# Patient Record
Sex: MALE | Race: WHITE | NOT HISPANIC OR LATINO | URBAN - METROPOLITAN AREA
[De-identification: names, ages, dates, MRNs, and addresses within clinical notes are randomized per-mention and may not be internally consistent; named-entity substitution may affect disease eponyms.]

---

## 2019-02-13 ENCOUNTER — EMERGENCY (EMERGENCY)
Facility: HOSPITAL | Age: 29
LOS: 1 days | End: 2019-02-13

## 2019-02-13 VITALS
TEMPERATURE: 98 F | HEIGHT: 72 IN | WEIGHT: 244.93 LBS | DIASTOLIC BLOOD PRESSURE: 78 MMHG | OXYGEN SATURATION: 98 % | SYSTOLIC BLOOD PRESSURE: 135 MMHG | HEART RATE: 94 BPM

## 2020-03-04 ENCOUNTER — APPOINTMENT (OUTPATIENT)
Dept: INTERNAL MEDICINE | Facility: CLINIC | Age: 30
End: 2020-03-04

## 2020-04-02 ENCOUNTER — APPOINTMENT (OUTPATIENT)
Dept: INTERNAL MEDICINE | Facility: CLINIC | Age: 30
End: 2020-04-02

## 2020-06-26 ENCOUNTER — NON-APPOINTMENT (OUTPATIENT)
Age: 30
End: 2020-06-26

## 2020-06-26 ENCOUNTER — TRANSCRIPTION ENCOUNTER (OUTPATIENT)
Age: 30
End: 2020-06-26

## 2020-06-26 ENCOUNTER — APPOINTMENT (OUTPATIENT)
Dept: INTERNAL MEDICINE | Facility: CLINIC | Age: 30
End: 2020-06-26
Payer: MEDICAID

## 2020-06-26 VITALS
DIASTOLIC BLOOD PRESSURE: 84 MMHG | RESPIRATION RATE: 17 BRPM | WEIGHT: 264 LBS | TEMPERATURE: 97.7 F | HEIGHT: 72 IN | BODY MASS INDEX: 35.76 KG/M2 | SYSTOLIC BLOOD PRESSURE: 120 MMHG | HEART RATE: 84 BPM | OXYGEN SATURATION: 98 %

## 2020-06-26 DIAGNOSIS — J30.2 OTHER SEASONAL ALLERGIC RHINITIS: ICD-10-CM

## 2020-06-26 DIAGNOSIS — Z23 ENCOUNTER FOR IMMUNIZATION: ICD-10-CM

## 2020-06-26 DIAGNOSIS — R31.29 OTHER MICROSCOPIC HEMATURIA: ICD-10-CM

## 2020-06-26 DIAGNOSIS — Z87.898 PERSONAL HISTORY OF OTHER SPECIFIED CONDITIONS: ICD-10-CM

## 2020-06-26 DIAGNOSIS — Z87.440 PERSONAL HISTORY OF URINARY (TRACT) INFECTIONS: ICD-10-CM

## 2020-06-26 DIAGNOSIS — Z00.00 ENCOUNTER FOR GENERAL ADULT MEDICAL EXAMINATION W/OUT ABNORMAL FINDINGS: ICD-10-CM

## 2020-06-26 DIAGNOSIS — Z12.83 ENCOUNTER FOR SCREENING FOR MALIGNANT NEOPLASM OF SKIN: ICD-10-CM

## 2020-06-26 DIAGNOSIS — E78.1 PURE HYPERGLYCERIDEMIA: ICD-10-CM

## 2020-06-26 DIAGNOSIS — E55.9 VITAMIN D DEFICIENCY, UNSPECIFIED: ICD-10-CM

## 2020-06-26 DIAGNOSIS — Z83.438 FAMILY HISTORY OF OTHER DISORDER OF LIPOPROTEIN METABOLISM AND OTHER LIPIDEMIA: ICD-10-CM

## 2020-06-26 DIAGNOSIS — K57.90 DIVERTICULOSIS OF INTESTINE, PART UNSPECIFIED, W/OUT PERFORATION OR ABSCESS W/OUT BLEEDING: ICD-10-CM

## 2020-06-26 DIAGNOSIS — R79.89 OTHER SPECIFIED ABNORMAL FINDINGS OF BLOOD CHEMISTRY: ICD-10-CM

## 2020-06-26 PROCEDURE — G0447 BEHAVIOR COUNSEL OBESITY 15M: CPT

## 2020-06-26 PROCEDURE — G0444 DEPRESSION SCREEN ANNUAL: CPT

## 2020-06-26 PROCEDURE — 93000 ELECTROCARDIOGRAM COMPLETE: CPT | Mod: 59

## 2020-06-26 PROCEDURE — 99385 PREV VISIT NEW AGE 18-39: CPT | Mod: 25

## 2020-06-26 RX ORDER — CHOLECALCIFEROL (VITAMIN D3) 50 MCG
50 MCG TABLET ORAL
Refills: 0 | Status: ACTIVE | COMMUNITY
Start: 2020-06-26

## 2020-06-26 RX ORDER — CETIRIZINE HYDROCHLORIDE 10 MG/1
10 TABLET, FILM COATED ORAL
Refills: 0 | Status: ACTIVE | COMMUNITY
Start: 2020-06-26

## 2020-06-27 LAB
25(OH)D3 SERPL-MCNC: 37.3 NG/ML
ALBUMIN SERPL ELPH-MCNC: 5.1 G/DL
ALP BLD-CCNC: 94 U/L
ALT SERPL-CCNC: 43 U/L
ANION GAP SERPL CALC-SCNC: 14 MMOL/L
AST SERPL-CCNC: 24 U/L
BASOPHILS # BLD AUTO: 0.03 K/UL
BASOPHILS NFR BLD AUTO: 0.4 %
BILIRUB SERPL-MCNC: 0.5 MG/DL
BUN SERPL-MCNC: 12 MG/DL
CALCIUM SERPL-MCNC: 10 MG/DL
CHLORIDE SERPL-SCNC: 102 MMOL/L
CHOLEST SERPL-MCNC: 184 MG/DL
CHOLEST/HDLC SERPL: 4.1 RATIO
CO2 SERPL-SCNC: 23 MMOL/L
CREAT SERPL-MCNC: 0.99 MG/DL
EOSINOPHIL # BLD AUTO: 0.12 K/UL
EOSINOPHIL NFR BLD AUTO: 1.6 %
ESTIMATED AVERAGE GLUCOSE: 97 MG/DL
GLUCOSE SERPL-MCNC: 87 MG/DL
HBA1C MFR BLD HPLC: 5 %
HCT VFR BLD CALC: 49.8 %
HDLC SERPL-MCNC: 45 MG/DL
HGB BLD-MCNC: 15.7 G/DL
IMM GRANULOCYTES NFR BLD AUTO: 0.1 %
LDLC SERPL CALC-MCNC: 118 MG/DL
LYMPHOCYTES # BLD AUTO: 2.62 K/UL
LYMPHOCYTES NFR BLD AUTO: 35.1 %
MAN DIFF?: NORMAL
MCHC RBC-ENTMCNC: 29.6 PG
MCHC RBC-ENTMCNC: 31.5 GM/DL
MCV RBC AUTO: 94 FL
MONOCYTES # BLD AUTO: 0.52 K/UL
MONOCYTES NFR BLD AUTO: 7 %
NEUTROPHILS # BLD AUTO: 4.17 K/UL
NEUTROPHILS NFR BLD AUTO: 55.8 %
PLATELET # BLD AUTO: 245 K/UL
POTASSIUM SERPL-SCNC: 4.5 MMOL/L
PROT SERPL-MCNC: 7.6 G/DL
RBC # BLD: 5.3 M/UL
RBC # FLD: 12.2 %
SODIUM SERPL-SCNC: 139 MMOL/L
TRIGL SERPL-MCNC: 107 MG/DL
TSH SERPL-ACNC: 1.42 UIU/ML
WBC # FLD AUTO: 7.47 K/UL

## 2020-07-06 ENCOUNTER — APPOINTMENT (OUTPATIENT)
Dept: ULTRASOUND IMAGING | Facility: CLINIC | Age: 30
End: 2020-07-06

## 2020-12-14 ENCOUNTER — TRANSCRIPTION ENCOUNTER (OUTPATIENT)
Age: 30
End: 2020-12-14

## 2021-07-14 ENCOUNTER — APPOINTMENT (OUTPATIENT)
Dept: UROLOGY | Facility: CLINIC | Age: 31
End: 2021-07-14

## 2021-08-31 ENCOUNTER — APPOINTMENT (OUTPATIENT)
Dept: AFTER HOURS CARE | Facility: EMERGENCY ROOM | Age: 31
End: 2021-08-31
Payer: MEDICAID

## 2021-09-01 DIAGNOSIS — B08.4 ENTEROVIRAL VESICULAR STOMATITIS WITH EXANTHEM: ICD-10-CM

## 2021-09-01 PROCEDURE — 99203 OFFICE O/P NEW LOW 30 MIN: CPT | Mod: 95

## 2021-09-01 RX ORDER — LIDOCAINE HYDROCHLORIDE 40 MG/ML
4 SOLUTION TOPICAL
Qty: 2 | Refills: 0 | Status: ACTIVE | COMMUNITY
Start: 2021-09-01 | End: 1900-01-01

## 2021-09-01 NOTE — HISTORY OF PRESENT ILLNESS
[Home] : at home, [unfilled] , at the time of the visit. [Other Location: e.g. Home (Enter Location, City,State)___] : at [unfilled] [Verbal consent obtained from patient] : the patient, [unfilled] [FreeTextEntry8] : 30M p/w 3d 6-8 painful oral lesions (1-2 under tongue, 2 on each cheek, 1-2 in throat) in association with 2 lesions on L palm and one on L sole which went away already. No fever. No STD exposure, No ticks, no other rash, No blisters.

## 2024-05-21 ENCOUNTER — APPOINTMENT (OUTPATIENT)
Dept: GASTROENTEROLOGY | Facility: CLINIC | Age: 34
End: 2024-05-21
Payer: MEDICAID

## 2024-05-21 VITALS
TEMPERATURE: 97.9 F | BODY MASS INDEX: 34.4 KG/M2 | DIASTOLIC BLOOD PRESSURE: 85 MMHG | OXYGEN SATURATION: 99 % | SYSTOLIC BLOOD PRESSURE: 128 MMHG | HEART RATE: 74 BPM | HEIGHT: 72 IN | WEIGHT: 254 LBS

## 2024-05-21 DIAGNOSIS — K59.09 OTHER CONSTIPATION: ICD-10-CM

## 2024-05-21 PROCEDURE — 99204 OFFICE O/P NEW MOD 45 MIN: CPT

## 2024-05-21 RX ORDER — POLYETHYLENE GLYCOL 3350 AND ELECTROLYTES WITH LEMON FLAVOR 236; 22.74; 6.74; 5.86; 2.97 G/4L; G/4L; G/4L; G/4L; G/4L
236 POWDER, FOR SOLUTION ORAL
Qty: 1 | Refills: 0 | Status: ACTIVE | COMMUNITY
Start: 2024-05-21 | End: 1900-01-01

## 2024-05-21 RX ORDER — LUBIPROSTONE 24 UG/1
24 CAPSULE ORAL
Qty: 60 | Refills: 3 | Status: ACTIVE | COMMUNITY
Start: 2024-05-21 | End: 1900-01-01

## 2024-05-21 NOTE — PHYSICAL EXAM
[Alert] : alert [Healthy Appearing] : healthy appearing [Sclera] : the sclera and conjunctiva were normal [Hearing Threshold Finger Rub Not Stokes] : hearing was normal [Normal Appearance] : the appearance of the neck was normal [No Respiratory Distress] : no respiratory distress [Normal S1, S2] : normal S1 and S2 [Abdomen Tenderness] : non-tender [Abdomen Soft] : soft [No Rectal Mass] : no rectal mass [No CVA Tenderness] : no CVA  tenderness [No Clubbing, Cyanosis] : no clubbing or cyanosis of the fingernails [] : no rash [Oriented To Time, Place, And Person] : oriented to person, place, and time [de-identified] : internal hemorrhoid,no stool in vault [de-identified] : mildly distended

## 2024-05-21 NOTE — ASSESSMENT
[FreeTextEntry1] : constipation probably due to semiglutide medication  plan bowel prep  x 1  then start amitiza bid

## 2024-05-21 NOTE — HISTORY OF PRESENT ILLNESS
[FreeTextEntry1] : 34 yo  male with h/o fatty liver and started on semiglutide for weight loss. patient reports severe constipation, started  with miralax, senna, fleets enema. and magnesium citrate with no improvment. and dulcolax. last bm small amount. no n/v. no gerd.   no family h/o colon cancer.

## 2024-05-21 NOTE — REVIEW OF SYSTEMS
[As Noted in HPI] : as noted in HPI [Fever] : no fever [Chills] : no chills [Red Eyes] : eyes not red [Chest Pain] : no chest pain [SOB on Exertion] : no shortness of breath during exertion [Rash] : no rash [Joint Stiffness] : no joint stiffness [Skin Wound] : no skin wound [Dizziness] : no dizziness [Anxiety] : no anxiety

## 2024-07-19 ENCOUNTER — APPOINTMENT (OUTPATIENT)
Dept: UROLOGY | Facility: CLINIC | Age: 34
End: 2024-07-19

## 2024-07-19 DIAGNOSIS — R31.29 OTHER MICROSCOPIC HEMATURIA: ICD-10-CM

## 2024-07-19 PROCEDURE — 99204 OFFICE O/P NEW MOD 45 MIN: CPT

## 2024-07-22 ENCOUNTER — APPOINTMENT (OUTPATIENT)
Dept: ULTRASOUND IMAGING | Facility: CLINIC | Age: 34
End: 2024-07-22
Payer: MEDICAID

## 2024-07-22 PROCEDURE — 76770 US EXAM ABDO BACK WALL COMP: CPT | Mod: 26

## 2024-07-24 LAB
APPEARANCE: CLEAR
BACTERIA: NEGATIVE /HPF
BILIRUBIN URINE: NEGATIVE
BLOOD URINE: ABNORMAL
CAST: NORMAL /LPF
COLOR: YELLOW
EPITHELIAL CELLS: 0 /HPF
GLUCOSE QUALITATIVE U: NEGATIVE MG/DL
KETONES URINE: NEGATIVE MG/DL
LEUKOCYTE ESTERASE URINE: NEGATIVE
MICROSCOPIC-UA: NORMAL
NITRITE URINE: NEGATIVE
PH URINE: 6.5
PROTEIN URINE: NEGATIVE MG/DL
RED BLOOD CELLS URINE: 0 /HPF
REVIEW: NORMAL
SPECIFIC GRAVITY URINE: 1.01
URINE CYTOLOGY: NORMAL
UROBILINOGEN URINE: 0.2 MG/DL
WHITE BLOOD CELLS URINE: 0 /HPF

## 2024-07-25 LAB — BACTERIA UR CULT: NORMAL

## 2024-07-31 ENCOUNTER — APPOINTMENT (OUTPATIENT)
Dept: UROLOGY | Facility: CLINIC | Age: 34
End: 2024-07-31
Payer: MEDICAID

## 2024-07-31 DIAGNOSIS — K59.09 OTHER CONSTIPATION: ICD-10-CM

## 2024-07-31 DIAGNOSIS — R31.29 OTHER MICROSCOPIC HEMATURIA: ICD-10-CM

## 2024-07-31 DIAGNOSIS — R79.89 OTHER SPECIFIED ABNORMAL FINDINGS OF BLOOD CHEMISTRY: ICD-10-CM

## 2024-07-31 PROCEDURE — 99205 OFFICE O/P NEW HI 60 MIN: CPT

## 2024-07-31 NOTE — PHYSICAL EXAM
[Normal Appearance] : normal appearance [Well Groomed] : well groomed [General Appearance - In No Acute Distress] : no acute distress [Respiration, Rhythm And Depth] : normal respiratory rhythm and effort [Exaggerated Use Of Accessory Muscles For Inspiration] : no accessory muscle use [] : no rash [Oriented To Time, Place, And Person] : oriented to person, place, and time [Affect] : the affect was normal [Mood] : the mood was normal

## 2024-08-01 NOTE — REVIEW OF SYSTEMS
[Told you have blood in urine on a urine test] : told blood was present in a urine test [Negative] : Heme/Lymph [Dysuria] : no dysuria [Incontinence] : no incontinence [Hesitancy] : no urinary hesitancy [Nocturia] : no nocturia [Blood in urine that you can see] : denies seeing blood in urine [Strong urge to urinate] : denies strong urge to urinate [Slow urine stream] : denies slow urine stream [Leakage of urine with straining, coughing, laughing] : denies leakage of urine with straining, coughing, and/or laughing [Unaware of when urine is leaking] : aware of when urine is leaking

## 2024-08-01 NOTE — ASSESSMENT
[FreeTextEntry1] : 07/19/2024: Mr. ILIA JACOBS presents today for an audio-visual telehealth visit for which they gave permission for. The patient was located at home at [address], [address] [place] [address] and I was located at my office in [place], [place].  Patient once saw Dr Keven Cruz in 2015 for microhematuria, urgency and frequency. He reportedly had a UA from outside lab on 8/26/15 with 14 rbc/hpf.  He later had UA with Dr Baca on 9/4/15 that was completely normal. He reports that over the next 8 years he has been reportedly found with microhematuria  during his annual physical.  He denies burning, gross hematuria urgency, frequency, pushing/straining, incontinence. He denies fhx of prostate cancer.  He reports fhx kidney stones, but no personal hx of kidney stones.   - CT A/P on 9/2015 wo Cont showed "Lobulated contour of upper left kidney. No renal calculi and no hydro".  -He later on obtained CT AP with cont for abdominal pain and tenderness that showed "mild to moderate diverticulosis in distal half of the colon. There were no diverticulitis". there were no abnormalities identified that were able to explain patient's right lower abd pain.  He has 3 children. he is with good sexual function.  No hx of surgeries He has never had a digital rectal exam. Also never had a Cystoscopy.  He takes Lubiprostone 24 mcg for weight loss.  Was never a smoker. He works from home, as .   Patient will send his outside records via email.   He will provide blood and urine sample at [address] [facility].  Pt will have US kidney and bladder at [address] [facility] or [facility].  He will have a visit afterwards to discuss results.    07/31/2024: Mr. ILIA JACOBS presents today for an audio-visual telehealth visit for which they gave permission for. The patient was located at home at [address] [facility] [address] [place] [address] and I was located at my office in [place], [place]. The patient provided a urine specimen on 7/23/2024. UA revealed small blood by dipstick, otherwise normal. Urine culture was no growth. Urine cytology was negative for high grade urothelial carcinoma. Patient had a renal US on 7/22/2024 which was all normal. The patient denies hx of smoking cigarettes. Smoked marijuana in college fairly consistently for 3 years. Pt has no complaints in regard to urination or sexual function. He reports that he has sent me his lab work from his primary over the last 6-7 years. There has been large blood by dipstick several times over the years. In Sep 2023, they evaluated his urine microscopically and there was not a significant amount of RBC/HPF. This urine was also dipstick negative for blood. A urine from 2021 also indicated large blood by dipstick but no significant RBC/HPF. On 6/17/2024 he was dipstick positive for blood but there is no microscopic exam. From 2021 to present, he has had at least 5 UA's done.   Reviewed and discussed lab work of 7/23/2024 in detail with the pt. Also reviewed lab work that the patient sent to me from his PCP. I reminded the patient that there are a lot of false positives for blood by dipstick.   Clinical findings and US results were reviewed at length with the patient.   Patient will provide two more urine specimens at his nearest [place] lab over the next month. If there is no evidence of microscopic blood, we will repeat another one in 3 months. I do not feel a cystoscopy or CT Urogram is necessary at this time.    Patient will have a telehealth visit in 1 month to review and discuss lab results, sooner if clinically indicated.   Preparation, audio-visual visit, and coordination of care took 45 minutes.

## 2024-08-01 NOTE — HISTORY OF PRESENT ILLNESS
[FreeTextEntry1] : 07/19/2024: Mr. ILIA JACOBS presents today for an audio-visual telehealth visit for which they gave permission for. The patient was located at home at [address] [place] [address], [address] [place] [address] and I was located at my office in [place], [place].  Patient once saw Dr Keven Cruz in 2015 for microhematuria, urgency and frequency. He reportedly had a UA from outside lab on 8/26/15 with 14 rbc/hpf.  He later had UA with Dr Baca on 9/4/15 that was completely normal. He reports that over the next 8 years he has been reportedly found with microhematuria  during his annual physical.  He denies burning, gross hematuria urgency, frequency, pushing/straining, incontinence. He denies fhx of prostate cancer.  He reports fhx kidney stones, but no personal hx of kidney stones.   - CT A/P on 9/2015 wo Cont showed "Lobulated contour of upper left kidney. No renal calculi and no hydro".  -He later on obtained CT AP with cont for abdominal pain and tenderness that showed "mild to moderate diverticulosis in distal half of the colon. There were no diverticulitis". there were no abnormalities identified that were able to explain patient's right lower abd pain.  He has 3 children. he is with good sexual function.  No hx of surgeries He has never had a digital rectal exam. Also never had a Cystoscopy.  He takes Lubiprostone 24 mcg for weight loss.  Was never a smoker He works from home, as .   07/31/2024: Mr. ILIA JACOBS presents today for an audio-visual telehealth visit for which they gave permission for. The patient was located at home at [address] [place] [address] [address] [place] [address] and I was located at my office in [place], [place]. The patient provided a urine specimen on 7/23/2024. UA revealed small blood by dipstick, otherwise normal. Urine culture was no growth. Urine cytology was negative for high grade urothelial carcinoma. Patient had a renal US on 7/22/2024 which was all normal. The patient denies hx of smoking cigarettes. Smoked marijuana in college fairly consistently for 3 years. Pt has no complaints in regard to urination or sexual function. He reports that he has sent me his lab work from his primary over the last 6-7 years. There has been large blood by dipstick several times over the years. In Sep 2023, they evaluated his urine microscopically and there was not a significant amount of RBC/HPF. This urine was also dipstick negative for blood. A urine from 2021 also indicated large blood by dipstick but no significant RBC/HPF. On 6/17/2024 he was dipstick positive for blood but there is no microscopic exam. From 2021 to present, he has had at least 5 UA's done.

## 2024-08-01 NOTE — ADDENDUM
[FreeTextEntry1] : This note was authored by Kyleigh Sauceda working as a scribe for Dr.Gary Leroy. I, Dr. Obi Leroy have reviewed the content of this note and confirm it is true and accurate. I personally performed the history and physical examination and made all the decisions 07/31/2024

## 2024-08-01 NOTE — HISTORY OF PRESENT ILLNESS
[FreeTextEntry1] : 07/19/2024: Mr. ILIA JACOBS presents today for an audio-visual telehealth visit for which they gave permission for. The patient was located at home at [address] [place] [address], [address] [place] [address] and I was located at my office in [place], [place].  Patient once saw Dr Keven Cruz in 2015 for microhematuria, urgency and frequency. He reportedly had a UA from outside lab on 8/26/15 with 14 rbc/hpf.  He later had UA with Dr Baca on 9/4/15 that was completely normal. He reports that over the next 8 years he has been reportedly found with microhematuria  during his annual physical.  He denies burning, gross hematuria urgency, frequency, pushing/straining, incontinence. He denies fhx of prostate cancer.  He reports fhx kidney stones, but no personal hx of kidney stones.   - CT A/P on 9/2015 wo Cont showed "Lobulated contour of upper left kidney. No renal calculi and no hydro".  -He later on obtained CT AP with cont for abdominal pain and tenderness that showed "mild to moderate diverticulosis in distal half of the colon. There were no diverticulitis". there were no abnormalities identified that were able to explain patient's right lower abd pain.  He has 3 children. he is with good sexual function.  No hx of surgeries He has never had a digital rectal exam. Also never had a Cystoscopy.  He takes Lubiprostone 24 mcg for weight loss.  Was never a smoker He works from home, as .   07/31/2024: Mr. ILIA JACOBS presents today for an audio-visual telehealth visit for which they gave permission for. The patient was located at home at [address] [place] [address] [address] [place] [address] and I was located at my office in [place], [place]. The patient provided a urine specimen on 7/23/2024. UA revealed small blood by dipstick, otherwise normal. Urine culture was no growth. Urine cytology was negative for high grade urothelial carcinoma. Patient had a renal US on 7/22/2024 which was all normal. The patient denies hx of smoking cigarettes. Smoked marijuana in college fairly consistently for 3 years. Pt has no complaints in regard to urination or sexual function. He reports that he has sent me his lab work from his primary over the last 6-7 years. There has been large blood by dipstick several times over the years. In Sep 2023, they evaluated his urine microscopically and there was not a significant amount of RBC/HPF. This urine was also dipstick negative for blood. A urine from 2021 also indicated large blood by dipstick but no significant RBC/HPF. On 6/17/2024 he was dipstick positive for blood but there is no microscopic exam. From 2021 to present, he has had at least 5 UA's done.

## 2024-09-04 ENCOUNTER — APPOINTMENT (OUTPATIENT)
Dept: UROLOGY | Facility: CLINIC | Age: 34
End: 2024-09-04

## 2024-09-06 ENCOUNTER — APPOINTMENT (OUTPATIENT)
Dept: UROLOGY | Facility: CLINIC | Age: 34
End: 2024-09-06

## 2025-07-30 ENCOUNTER — RX RENEWAL (OUTPATIENT)
Age: 35
End: 2025-07-30

## 2025-08-19 ENCOUNTER — OUTPATIENT (OUTPATIENT)
Dept: OUTPATIENT SERVICES | Facility: HOSPITAL | Age: 35
LOS: 1 days | End: 2025-08-19
Payer: MEDICAID

## 2025-08-19 DIAGNOSIS — Z00.00 ENCOUNTER FOR GENERAL ADULT MEDICAL EXAMINATION WITHOUT ABNORMAL FINDINGS: ICD-10-CM

## 2025-08-19 PROCEDURE — 86901 BLOOD TYPING SEROLOGIC RH(D): CPT

## 2025-08-19 PROCEDURE — 86905 BLOOD TYPING RBC ANTIGENS: CPT

## 2025-08-19 PROCEDURE — 86900 BLOOD TYPING SEROLOGIC ABO: CPT

## 2025-08-19 PROCEDURE — 86850 RBC ANTIBODY SCREEN: CPT
